# Patient Record
Sex: FEMALE | Race: WHITE | NOT HISPANIC OR LATINO | Employment: OTHER | URBAN - NONMETROPOLITAN AREA
[De-identification: names, ages, dates, MRNs, and addresses within clinical notes are randomized per-mention and may not be internally consistent; named-entity substitution may affect disease eponyms.]

---

## 2017-06-01 PROBLEM — D69.6 THROMBOCYTOPENIA (HCC): Status: ACTIVE | Noted: 2017-06-01

## 2017-06-02 ENCOUNTER — CONSULT (OUTPATIENT)
Dept: ONCOLOGY | Facility: CLINIC | Age: 67
End: 2017-06-02

## 2017-06-02 VITALS
SYSTOLIC BLOOD PRESSURE: 141 MMHG | TEMPERATURE: 97.6 F | BODY MASS INDEX: 38.58 KG/M2 | RESPIRATION RATE: 20 BRPM | DIASTOLIC BLOOD PRESSURE: 68 MMHG | HEART RATE: 65 BPM | HEIGHT: 64 IN | WEIGHT: 226 LBS

## 2017-06-02 DIAGNOSIS — D69.6 THROMBOCYTOPENIA (HCC): Primary | ICD-10-CM

## 2017-06-02 PROCEDURE — 99204 OFFICE O/P NEW MOD 45 MIN: CPT | Performed by: INTERNAL MEDICINE

## 2017-06-02 RX ORDER — ATENOLOL 100 MG/1
TABLET ORAL
COMMUNITY
Start: 2017-05-15 | End: 2019-02-20 | Stop reason: DRUGHIGH

## 2017-06-02 RX ORDER — METFORMIN HYDROCHLORIDE 750 MG/1
TABLET, EXTENDED RELEASE ORAL
COMMUNITY
Start: 2017-05-24

## 2017-06-02 RX ORDER — LOSARTAN POTASSIUM 50 MG/1
TABLET ORAL
COMMUNITY
Start: 2017-05-24

## 2017-06-02 NOTE — PROGRESS NOTES
"CHIEF COMPLAINT: Isolated thrombocytopenia    REASON FOR REFERRAL: Isolated thrombocytopenia      RECORDS OBTAINED  Records of the patients history including those obtained from  Dr. Cox were reviewed and summarized in detail.    HISTORY OF PRESENT ILLNESS:  The patient is a 66 y.o.  female, referred for isolated thrombocytopenia.  Has apparently seen me for this in the past but I have none of those records at this junction.  This is isolated thrombocytopenia with platelets of 50-70,000 dating back at least.  She says it's been present for longer than that.  She denies any other signs or symptoms of autoimmune disease and no Helicobacter pylori though she does have a history of reflux.  Basically feels fine.  No petechiae or ecchymoses.  Denies alcohol use or abuse    REVIEW OF SYSTEMS:  A 14 point review of systems was performed and is negative except as noted above.    Past Medical History:   Diagnosis Date   • Anemia    • Diabetes mellitus    • Hypertension      Past Surgical History:   Procedure Laterality Date   • TUBAL ABDOMINAL LIGATION  1980       No current outpatient prescriptions on file prior to visit.     No current facility-administered medications on file prior to visit.        Allergies   Allergen Reactions   • Penicillins Rash       Social History     Social History   • Marital status: Single     Spouse name: N/A   • Number of children: N/A   • Years of education: N/A     Social History Main Topics   • Smoking status: Never Smoker   • Smokeless tobacco: None   • Alcohol use Yes   • Drug use: None   • Sexual activity: Not Asked     Other Topics Concern   • None     Social History Narrative   • None       Family History   Problem Relation Age of Onset   • Diabetes Father    • Heart disease Father    • Hypertension Father    • Asthma Father    • Heart disease Brother    • Colon cancer Paternal Grandmother        PHYSICAL EXAM:    /68  Pulse 65  Temp 97.6 °F (36.4 °C)  Resp 20  Ht 64\" " (162.6 cm)  Wt 226 lb (103 kg)  BMI 38.79 kg/m2    ECOG: (0) Fully active, able to carry on all predisease performance without restriction  General: well appearing female in no acute distress  HEENT: sclera anicteric, oropharynx clear  Lymphatics: no cervical, supraclavicular, inguinal, or axillary adenopathy  Cardiovascular: regular rate and rhythm, no murmurs  Neck: Supple; No thyromegaly  Lungs: clear to auscultation bilaterally. No respiratory distress.   Abdomen: soft, nontender, nondistended.  No palpable organomegaly  Extremities: no cyanosis, clubbing, edema, or cords  Skin: no rashes, lesions, bruising, or petechiae  Neuro: Alert and oriented x 4; Moving all extremities.  Psych: No anxiety or depression    No results found for any previous visit.    Assessment/Plan     1. Isolated thrombocytopenia    Discussion: We will check her peripheral smear for schistocytes and check her PT PTT d-dimer and fibrin split products for the possibility of DIC.  Her peripheral smear will also be checked for platelet clumping for pseudothrombocytopenia.  We'll check her MARGO and rheumatoid factor and I suspect that this is immune thrombocytopenic purpura for which I would not treat her unless she develops a platelet count below 30,000.  I would not generally do CAT scans her bone marrow biopsies at this junction.  I will check her renal function but thus far has no symptoms to suggest microangiopathic process.  We'll see her back in a few weeks to go over the results of all this and see her blood count is just prior to return as well.      Micah Montiel MD    6/2/2017

## 2017-06-06 LAB
ALBUMIN SERPL-MCNC: 3.8 G/DL (ref 3.6–4.8)
ALBUMIN/GLOB SERPL: 1.3 {RATIO} (ref 1.2–2.2)
ALP SERPL-CCNC: 178 IU/L (ref 39–117)
ALT SERPL-CCNC: 37 IU/L (ref 0–32)
AMBIG ABBREV CMP14 DEFAULT: NORMAL
ANA TITR SER IF: NEGATIVE {TITER}
APTT PPP: 27 SEC (ref 24–33)
AST SERPL-CCNC: 58 IU/L (ref 0–40)
BASOPHILS # BLD AUTO: 0 X10E3/UL (ref 0–0.2)
BASOPHILS NFR BLD AUTO: 1 %
BILIRUB SERPL-MCNC: 0.7 MG/DL (ref 0–1.2)
BUN SERPL-MCNC: 10 MG/DL (ref 8–27)
BUN/CREAT SERPL: 13 (ref 12–28)
CALCIUM SERPL-MCNC: 9.1 MG/DL (ref 8.7–10.3)
CHLORIDE SERPL-SCNC: 100 MMOL/L (ref 96–106)
CO2 SERPL-SCNC: 25 MMOL/L (ref 18–29)
CREAT SERPL-MCNC: 0.8 MG/DL (ref 0.57–1)
D DIMER PPP FEU-MCNC: 0.49 MG/L FEU (ref 0–0.49)
EOSINOPHIL # BLD AUTO: 0.2 X10E3/UL (ref 0–0.4)
EOSINOPHIL NFR BLD AUTO: 5 %
ERYTHROCYTE [DISTWIDTH] IN BLOOD BY AUTOMATED COUNT: 15.3 % (ref 12.3–15.4)
FIBRINOGEN PPP-MCNC: 315 MG/DL (ref 193–507)
GLOBULIN SER CALC-MCNC: 2.9 G/DL (ref 1.5–4.5)
GLUCOSE SERPL-MCNC: 166 MG/DL (ref 65–99)
H PYLORI IGA SER-ACNC: <9 UNITS (ref 0–8.9)
H PYLORI IGG SER IA-ACNC: <0.9 U/ML (ref 0–0.8)
H PYLORI IGM SER-ACNC: <9 UNITS (ref 0–8.9)
HCT VFR BLD AUTO: 39.7 % (ref 34–46.6)
HGB BLD-MCNC: 13.5 G/DL (ref 11.1–15.9)
IMM GRANULOCYTES # BLD: 0 X10E3/UL (ref 0–0.1)
IMM GRANULOCYTES NFR BLD: 0 %
INR PPP: 1.1 (ref 0.8–1.2)
LYMPHOCYTES # BLD AUTO: 1.5 X10E3/UL (ref 0.7–3.1)
LYMPHOCYTES NFR BLD AUTO: 34 %
MCH RBC QN AUTO: 29.2 PG (ref 26.6–33)
MCHC RBC AUTO-ENTMCNC: 34 G/DL (ref 31.5–35.7)
MCV RBC AUTO: 86 FL (ref 79–97)
MONOCYTES # BLD AUTO: 0.2 X10E3/UL (ref 0.1–0.9)
MONOCYTES NFR BLD AUTO: 6 %
MORPHOLOGY BLD-IMP: (no result)
NEUTROPHILS # BLD AUTO: 2.3 X10E3/UL (ref 1.4–7)
NEUTROPHILS NFR BLD AUTO: 54 %
PATH REV BLD -IMP: NORMAL
PATHOLOGIST NAME: NORMAL
PLATELET # BLD AUTO: 54 X10E3/UL (ref 150–379)
POTASSIUM SERPL-SCNC: 4.1 MMOL/L (ref 3.5–5.2)
PROT SERPL-MCNC: 6.7 G/DL (ref 6–8.5)
PROTHROMBIN TIME: 11.2 SEC (ref 9.1–12)
RBC # BLD AUTO: 4.63 X10E6/UL (ref 3.77–5.28)
RHEUMATOID FACT SERPL-ACNC: <10 IU/ML (ref 0–13.9)
SODIUM SERPL-SCNC: 141 MMOL/L (ref 134–144)
WBC # BLD AUTO: 4.3 X10E3/UL (ref 3.4–10.8)

## 2017-08-01 ENCOUNTER — RESULTS ENCOUNTER (OUTPATIENT)
Dept: ONCOLOGY | Facility: CLINIC | Age: 67
End: 2017-08-01

## 2017-08-01 DIAGNOSIS — D69.6 THROMBOCYTOPENIA (HCC): ICD-10-CM

## 2017-08-02 LAB
BASOPHILS # BLD AUTO: 0 X10E3/UL (ref 0–0.2)
BASOPHILS NFR BLD AUTO: 1 %
EOSINOPHIL # BLD AUTO: 0.2 X10E3/UL (ref 0–0.4)
EOSINOPHIL NFR BLD AUTO: 5 %
ERYTHROCYTE [DISTWIDTH] IN BLOOD BY AUTOMATED COUNT: 15.4 % (ref 12.3–15.4)
HCT VFR BLD AUTO: 38.8 % (ref 34–46.6)
HGB BLD-MCNC: 13 G/DL (ref 11.1–15.9)
IMM GRANULOCYTES # BLD: 0 X10E3/UL (ref 0–0.1)
IMM GRANULOCYTES NFR BLD: 0 %
LYMPHOCYTES # BLD AUTO: 1.2 X10E3/UL (ref 0.7–3.1)
LYMPHOCYTES NFR BLD AUTO: 27 %
MCH RBC QN AUTO: 29.1 PG (ref 26.6–33)
MCHC RBC AUTO-ENTMCNC: 33.5 G/DL (ref 31.5–35.7)
MCV RBC AUTO: 87 FL (ref 79–97)
MONOCYTES # BLD AUTO: 0.4 X10E3/UL (ref 0.1–0.9)
MONOCYTES NFR BLD AUTO: 9 %
MORPHOLOGY BLD-IMP: (no result)
NEUTROPHILS # BLD AUTO: 2.6 X10E3/UL (ref 1.4–7)
NEUTROPHILS NFR BLD AUTO: 58 %
PLATELET # BLD AUTO: 58 X10E3/UL (ref 150–379)
RBC # BLD AUTO: 4.47 X10E6/UL (ref 3.77–5.28)
WBC # BLD AUTO: 4.5 X10E3/UL (ref 3.4–10.8)

## 2017-08-03 ENCOUNTER — OFFICE VISIT (OUTPATIENT)
Dept: ONCOLOGY | Facility: CLINIC | Age: 67
End: 2017-08-03

## 2017-08-03 VITALS
TEMPERATURE: 98.6 F | WEIGHT: 228 LBS | RESPIRATION RATE: 20 BRPM | HEIGHT: 64 IN | DIASTOLIC BLOOD PRESSURE: 71 MMHG | HEART RATE: 73 BPM | BODY MASS INDEX: 38.93 KG/M2 | SYSTOLIC BLOOD PRESSURE: 151 MMHG

## 2017-08-03 DIAGNOSIS — D69.6 THROMBOCYTOPENIA (HCC): Primary | ICD-10-CM

## 2017-08-03 PROCEDURE — 99213 OFFICE O/P EST LOW 20 MIN: CPT | Performed by: INTERNAL MEDICINE

## 2017-08-03 NOTE — PROGRESS NOTES
CHIEF COMPLAINT: Thrombocytopenia   Problem List:  1.)  Isolated thrombocytopenia:  -Peripheral smear showed decreased platelets with no clumping or schistocytes  -INR 1.1 with PTT 27  -D-dimer 0.49 with normal fibrinogen  -Helicobacter pylori negative  -MARGO and rheumatoid factor negative  - creatinine 0.8  -AST 58 with a ALT 37 and alkaline phosphatase of 178  HISTORY OF PRESENT ILLNESS:  The patient is a 66 y.o. female, here for follow up on management of isolated thrombocytopenia.  No bleeding problems or ecchymoses.  No rashes.  No synovitis.  No adenopathy.  No unexplained weight loss or bed drenching night sweats.  Workup as above only showed mild elevation of the AST and ALT and alkaline phosphatase otherwise unremarkable.  Past Medical History:   Diagnosis Date   • Anemia    • Diabetes mellitus    • Hypertension      Past Surgical History:   Procedure Laterality Date   • TUBAL ABDOMINAL LIGATION  1980       Allergies   Allergen Reactions   • Penicillins Rash       Family History and Social History reviewed and changed as necessary      REVIEW OF SYSTEM:   Review of Systems   Constitutional: Negative for appetite change, chills, diaphoresis, fatigue, fever and unexpected weight change.   HENT:   Negative for mouth sores, sore throat and trouble swallowing.    Eyes: Negative for icterus.   Respiratory: Negative for cough, hemoptysis and shortness of breath.    Cardiovascular: Negative for chest pain, leg swelling and palpitations.   Gastrointestinal: Negative for abdominal distention, abdominal pain, blood in stool, constipation, diarrhea, nausea and vomiting.   Endocrine: Negative for hot flashes.   Genitourinary: Negative for bladder incontinence, difficulty urinating, dysuria, frequency and hematuria.    Musculoskeletal: Negative for gait problem, neck pain and neck stiffness.   Skin: Negative for rash.   Neurological: Negative for dizziness, gait problem, headaches, light-headedness and numbness.  "  Hematological: Negative for adenopathy. Does not bruise/bleed easily.   Psychiatric/Behavioral: Negative for depression. The patient is not nervous/anxious.    All other systems reviewed and are negative.       PHYSICAL EXAM    Vitals:    08/03/17 1309   BP: 151/71   Pulse: 73   Resp: 20   Temp: 98.6 °F (37 °C)   Weight: 228 lb (103 kg)   Height: 64\" (162.6 cm)     Constitutional: Appears well-developed and well-nourished. No distress.   ECOG: (0) Fully active, able to carry on all predisease performance without restriction  HENT:   Head: Normocephalic.   Mouth/Throat: Oropharynx is clear and moist.   Eyes: Conjunctivae are normal. Pupils are equal, round, and reactive to light. No scleral icterus.   Neck: Neck supple. No JVD present. No thyromegaly present.   Cardiovascular: Normal rate, regular rhythm and normal heart sounds.    Pulmonary/Chest: Breath sounds normal. No respiratory distress.   Abdominal: Soft. Exhibits no distension and no mass. There is no hepatosplenomegaly. There is no tenderness. There is no rebound and no guarding.   Musculoskeletal:Exhibits no edema, tenderness or deformity.   Neurological: Alert and oriented to person, place, and time. Exhibits normal muscle tone.   Skin: No ecchymosis, no petechiae and no rash noted. Not diaphoretic. No cyanosis. Nails show no clubbing.   Psychiatric: Normal mood and affect.   Vitals reviewed.      Orders Only on 08/01/2017   Component Date Value Ref Range Status   • WBC 08/01/2017 4.5  3.4 - 10.8 x10E3/uL Final   • RBC 08/01/2017 4.47  3.77 - 5.28 x10E6/uL Final   • Hemoglobin 08/01/2017 13.0  11.1 - 15.9 g/dL Final   • Hematocrit 08/01/2017 38.8  34.0 - 46.6 % Final   • MCV 08/01/2017 87  79 - 97 fL Final   • MCH 08/01/2017 29.1  26.6 - 33.0 pg Final   • MCHC 08/01/2017 33.5  31.5 - 35.7 g/dL Final   • RDW 08/01/2017 15.4  12.3 - 15.4 % Final   • Platelets 08/01/2017 58* 150 - 379 x10E3/uL Final    Platelet count verified by examination of peripheral " blood smear.   • Neutrophil Rel % 08/01/2017 58  % Final   • Lymphocyte Rel % 08/01/2017 27  % Final   • Monocyte Rel % 08/01/2017 9  % Final   • Eosinophil Rel % 08/01/2017 5  % Final   • Basophil Rel % 08/01/2017 1  % Final   • Neutrophils Absolute 08/01/2017 2.6  1.4 - 7.0 x10E3/uL Final   • Lymphocytes Absolute 08/01/2017 1.2  0.7 - 3.1 x10E3/uL Final   • Monocytes Absolute 08/01/2017 0.4  0.1 - 0.9 x10E3/uL Final   • Eosinophils Absolute 08/01/2017 0.2  0.0 - 0.4 x10E3/uL Final   • Basophils Absolute 08/01/2017 0.0  0.0 - 0.2 x10E3/uL Final   • Immature Granulocyte Rel % 08/01/2017 0  % Final   • Immature Grans Absolute 08/01/2017 0.0  0.0 - 0.1 x10E3/uL Final   • Hematology Comments: 08/01/2017 Note:   Final    Manual differential was performed.       Assessment/Plan     1.  Thrombocytopenia  2. Diabetes  3. Elevated liver enzymes    Discussion: I suspect she has fatty liver disease in light of her diabetes causing a mild elevation in her liver enzymes but we will check a of her spleen ultrasound to look for evidences of portal hypertension causing the thrombocytopenia.  We'll call her those results and otherwise if that is unremarkable I would suspect that this is immune thrombocytopenic purpura with isolated thrombocytopenia in an otherwise fairly healthy middle-age female.  No other stigmata to suggest TTP HUS, DIC, other associated autoimmune diseases, or lymphoma or other malignant causes.  If her platelets are staying over 30,000 that would not give steroids but we'll check her blood counts quarterly and see her back in 6 months.  Micah Montiel MD    08/03/2017

## 2017-08-10 ENCOUNTER — TELEPHONE (OUTPATIENT)
Dept: ONCOLOGY | Facility: CLINIC | Age: 67
End: 2017-08-10

## 2017-08-10 DIAGNOSIS — K76.6 PORTAL HYPERTENSION SYNDROME (HCC): Primary | ICD-10-CM

## 2017-08-10 NOTE — TELEPHONE ENCOUNTER
8/7/17 liver spleen ultrasound shows portal vein to be patent with hepato-petal flow.  The flow pattern is normal.  The portal vein is dilated to 1.7 cm consistent with portal hypertension.  Superior mesenteric and splenic veins are patent with normal flow.  The superior mesenteric vein is dilated at 1.4 cm consistent with portal hypertension.  There is recanalized umbilical vein showing hepatofugal flow emptying into the right common femoral vein.  The hepatic veins are patent without outflow obstruction and hepatic arterial blood flow is within normal showing no arterial occlusive disease.  The inferior vena cava is patent and the flow is normal.  The spleen is 14.1 x 6.3 x 13.6 cm of splenomegaly.  There are is no intrahepatic ductal dilation.  No biliary obstruction.  No masses identified within the liver parenchyma.  No cholelithiasis.  Could not visualize the pancreas due to overlying bowel gas.    Platelets stable in the 50,000 without bleeding and the rest of her clotting studies in June were normal and her liver enzymes showed minimal elevation of her AST and ALT to 30's and 50s respectively.  no anemia and no leukopenia.    I called the patient and went over these results with her.  I'm going to make an appointment with [facility] and I've spoken with Dr. Chico Kee who saw this study.  He does not think there is anything here that would warrant anticoagulation.  I will defer to [facility] for testing for hemochromatosis, autoimmune hepatitis, fatty liver infiltration with nonalcoholic steatohepatitis, and to decide whether T IPS is warranted though I doubted given that she seems to have already collateralized successfully.  I'm going to send her to Dr. Gray and colleagues.

## 2017-11-08 ENCOUNTER — RESULTS ENCOUNTER (OUTPATIENT)
Dept: ONCOLOGY | Facility: CLINIC | Age: 67
End: 2017-11-08

## 2017-11-08 DIAGNOSIS — D69.6 THROMBOCYTOPENIA (HCC): ICD-10-CM

## 2017-11-15 LAB
BASOPHILS # BLD AUTO: 0 X10E3/UL (ref 0–0.2)
BASOPHILS NFR BLD AUTO: 1 %
EOSINOPHIL # BLD AUTO: 0.2 X10E3/UL (ref 0–0.4)
EOSINOPHIL NFR BLD AUTO: 6 %
ERYTHROCYTE [DISTWIDTH] IN BLOOD BY AUTOMATED COUNT: 15.4 % (ref 12.3–15.4)
HCT VFR BLD AUTO: 38.1 % (ref 34–46.6)
HGB BLD-MCNC: 12.8 G/DL (ref 11.1–15.9)
IMM GRANULOCYTES # BLD: 0 X10E3/UL (ref 0–0.1)
IMM GRANULOCYTES NFR BLD: 0 %
LYMPHOCYTES # BLD AUTO: 1 X10E3/UL (ref 0.7–3.1)
LYMPHOCYTES NFR BLD AUTO: 26 %
MCH RBC QN AUTO: 29.4 PG (ref 26.6–33)
MCHC RBC AUTO-ENTMCNC: 33.6 G/DL (ref 31.5–35.7)
MCV RBC AUTO: 88 FL (ref 79–97)
MONOCYTES # BLD AUTO: 0.4 X10E3/UL (ref 0.1–0.9)
MONOCYTES NFR BLD AUTO: 10 %
MORPHOLOGY BLD-IMP: (no result)
NEUTROPHILS # BLD AUTO: 2.3 X10E3/UL (ref 1.4–7)
NEUTROPHILS NFR BLD AUTO: 57 %
PLATELET # BLD AUTO: 54 X10E3/UL (ref 150–379)
RBC # BLD AUTO: 4.35 X10E6/UL (ref 3.77–5.28)
WBC # BLD AUTO: 4 X10E3/UL (ref 3.4–10.8)

## 2018-02-06 ENCOUNTER — RESULTS ENCOUNTER (OUTPATIENT)
Dept: ONCOLOGY | Facility: CLINIC | Age: 68
End: 2018-02-06

## 2018-02-06 DIAGNOSIS — D69.6 THROMBOCYTOPENIA (HCC): ICD-10-CM

## 2018-02-23 ENCOUNTER — OFFICE VISIT (OUTPATIENT)
Dept: ONCOLOGY | Facility: CLINIC | Age: 68
End: 2018-02-23

## 2018-02-23 VITALS
TEMPERATURE: 98.3 F | WEIGHT: 217 LBS | HEART RATE: 72 BPM | SYSTOLIC BLOOD PRESSURE: 155 MMHG | DIASTOLIC BLOOD PRESSURE: 63 MMHG | BODY MASS INDEX: 37.05 KG/M2 | HEIGHT: 64 IN | RESPIRATION RATE: 18 BRPM

## 2018-02-23 DIAGNOSIS — D69.6 THROMBOCYTOPENIA (HCC): Primary | ICD-10-CM

## 2018-02-23 PROCEDURE — 99214 OFFICE O/P EST MOD 30 MIN: CPT | Performed by: INTERNAL MEDICINE

## 2018-02-23 RX ORDER — FLUCONAZOLE 100 MG/1
100 TABLET ORAL DAILY
Qty: 6 TABLET | Refills: 0 | Status: SHIPPED | OUTPATIENT
Start: 2018-02-23 | End: 2019-02-20 | Stop reason: DRUGHIGH

## 2018-02-23 NOTE — PROGRESS NOTES
CHIEF COMPLAINT: vaginitis    Problem List:  Oncology/Hematology History    1. Thrombocytopenia  -Peripheral smear showed decreased platelets with no clumping or schistocytes  -INR 1.1 with PTT 27  -D-dimer 0.49 with normal fibrinogen  -Helicobacter pylori negative  -MARGO and rheumatoid factor negative  - creatinine 0.8  -AST 58 with a ALT 37 and alkaline phosphatase of 178  -8/7/17 liver spleen ultrasound shows portal vein to be patent with hepato-petal flow.  The flow pattern is normal.  The portal vein is dilated to 1.7 cm consistent with portal hypertension.  Superior mesenteric and splenic veins are patent with normal flow.  The superior mesenteric vein is dilated at 1.4 cm consistent with portal hypertension.  There is recanalized umbilical vein showing hepatofugal flow emptying into the right common femoral vein.  The hepatic veins are patent without outflow obstruction and hepatic arterial blood flow is within normal showing no arterial occlusive disease.  The inferior vena cava is patent and the flow is normal.  The spleen is 14.1 x 6.3 x 13.6 cm of splenomegaly.  There are is no intrahepatic ductal dilation.  No biliary obstruction.  No masses identified within the liver parenchyma.  No cholelithiasis.  Could not visualize the pancreas due to overlying bowel gas.     Platelets stable in the 50,000 without bleeding and the rest of her clotting studies in June were normal and her liver enzymes showed minimal elevation of her AST and ALT to 30's and 50s respectively.  no anemia and no leukopenia.        Thrombocytopenia    6/1/2017 Initial Diagnosis     Thrombocytopenia          HISTORY OF PRESENT ILLNESS:  The patient is a 67 y.o. female, here for follow up on management of portal hypertension with thrombocytopenia.  Platelets 60,000's with no hemostatic issues.  Was in the emergency room the 13th of this month with colitis.  On antibiotics that has improved greatly but now she has yeast vaginitis.  She saw  "the hepatologists at [facility] and they are monitoring her portal hypertension.      Past Medical History:   Diagnosis Date   • Anemia    • Diabetes mellitus    • Hypertension      Past Surgical History:   Procedure Laterality Date   • TUBAL ABDOMINAL LIGATION  1980       Allergies   Allergen Reactions   • Penicillins Rash       Family History and Social History reviewed and changed as necessary      REVIEW OF SYSTEM:   Review of Systems   Constitutional: Negative for appetite change, chills, diaphoresis, fatigue, fever and unexpected weight change.   HENT:   Negative for mouth sores, sore throat and trouble swallowing.    Eyes: Negative for icterus.   Respiratory: Negative for cough, hemoptysis and shortness of breath.    Cardiovascular: Negative for chest pain, leg swelling and palpitations.   Gastrointestinal: Negative for abdominal distention, abdominal pain, blood in stool, constipation, diarrhea, nausea and vomiting.   Endocrine: Negative for hot flashes.   Genitourinary: Negative for bladder incontinence, difficulty urinating, dysuria, frequency and hematuria.    Musculoskeletal: Negative for gait problem, neck pain and neck stiffness.   Skin: Negative for rash.   Neurological: Negative for dizziness, gait problem, headaches, light-headedness and numbness.   Hematological: Negative for adenopathy. Does not bruise/bleed easily.   Psychiatric/Behavioral: Negative for depression. The patient is not nervous/anxious.    All other systems reviewed and are negative.       PHYSICAL EXAM    Vitals:    02/23/18 1440   BP: 155/63   Pulse: 72   Resp: 18   Temp: 98.3 °F (36.8 °C)   Weight: 98.4 kg (217 lb)   Height: 162.6 cm (64\")     Constitutional: Appears well-developed and well-nourished. No distress.   ECOG: (0) Fully active, able to carry on all predisease performance without restriction  HENT:   Head: Normocephalic.   Mouth/Throat: Oropharynx is clear and moist.   Eyes: Conjunctivae are normal. Pupils are equal, round, " and reactive to light. No scleral icterus.   Neck: Neck supple. No JVD present. No thyromegaly present.   Cardiovascular: Normal rate, regular rhythm and normal heart sounds.    Pulmonary/Chest: Breath sounds normal. No respiratory distress.   Abdominal: Soft. Exhibits no distension and no mass. There is no hepatosplenomegaly. There is no tenderness. There is no rebound and no guarding.   Musculoskeletal:Exhibits no edema, tenderness or deformity.   Neurological: Alert and oriented to person, place, and time. Exhibits normal muscle tone.   Skin: No ecchymosis, no petechiae and no rash noted. Not diaphoretic. No cyanosis. Nails show no clubbing.   Psychiatric: Normal mood and affect.   Vitals reviewed.        Assessment/Plan     1.  portal hypertension  2. Thrombocytopenia  3. Colitis  4. Yeast vaginitis    Discussion: I will give her a prescription for Diflucan for her yeast vaginitis.  I will see her back annually but from the thrombocytopenia standpoint, I have nothing to add.  She has not had bleeding problems  And is unlikely to have such.  Transfusions are unlikely to be  Due to splenic sequestration.  She will follow-up with [facility] hepatology      Micah Montiel MD    02/23/2018

## 2019-02-19 LAB
ALBUMIN SERPL-MCNC: 3.9 G/DL (ref 3.5–4.8)
ALBUMIN/GLOB SERPL: 1.4 {RATIO} (ref 1.2–2.2)
ALP SERPL-CCNC: 200 IU/L (ref 39–117)
ALT SERPL-CCNC: 43 IU/L (ref 0–32)
AMBIG ABBREV CMP14 DEFAULT: NORMAL
AST SERPL-CCNC: 59 IU/L (ref 0–40)
BASOPHILS # BLD AUTO: 0 X10E3/UL (ref 0–0.2)
BASOPHILS NFR BLD AUTO: 1 %
BILIRUB SERPL-MCNC: 1.5 MG/DL (ref 0–1.2)
BUN SERPL-MCNC: 11 MG/DL (ref 8–27)
BUN/CREAT SERPL: 16 (ref 12–28)
CALCIUM SERPL-MCNC: 9 MG/DL (ref 8.7–10.3)
CHLORIDE SERPL-SCNC: 106 MMOL/L (ref 96–106)
CO2 SERPL-SCNC: 22 MMOL/L (ref 20–29)
CREAT SERPL-MCNC: 0.69 MG/DL (ref 0.57–1)
EOSINOPHIL # BLD AUTO: 0.3 X10E3/UL (ref 0–0.4)
EOSINOPHIL NFR BLD AUTO: 10 %
ERYTHROCYTE [DISTWIDTH] IN BLOOD BY AUTOMATED COUNT: 16.5 % (ref 12.3–15.4)
GLOBULIN SER CALC-MCNC: 2.7 G/DL (ref 1.5–4.5)
GLUCOSE SERPL-MCNC: 222 MG/DL (ref 65–99)
HCT VFR BLD AUTO: 35.4 % (ref 34–46.6)
HGB BLD-MCNC: 11.9 G/DL (ref 11.1–15.9)
IMM GRANULOCYTES # BLD AUTO: 0 X10E3/UL (ref 0–0.1)
IMM GRANULOCYTES NFR BLD AUTO: 0 %
LYMPHOCYTES # BLD AUTO: 0.9 X10E3/UL (ref 0.7–3.1)
LYMPHOCYTES NFR BLD AUTO: 28 %
MCH RBC QN AUTO: 28.8 PG (ref 26.6–33)
MCHC RBC AUTO-ENTMCNC: 33.6 G/DL (ref 31.5–35.7)
MCV RBC AUTO: 86 FL (ref 79–97)
MONOCYTES # BLD AUTO: 0.3 X10E3/UL (ref 0.1–0.9)
MONOCYTES NFR BLD AUTO: 8 %
MORPHOLOGY BLD-IMP: (no result)
NEUTROPHILS # BLD AUTO: 1.6 X10E3/UL (ref 1.4–7)
NEUTROPHILS NFR BLD AUTO: 53 %
PLATELET # BLD AUTO: 46 X10E3/UL (ref 150–379)
POTASSIUM SERPL-SCNC: 4 MMOL/L (ref 3.5–5.2)
PROT SERPL-MCNC: 6.6 G/DL (ref 6–8.5)
RBC # BLD AUTO: 4.13 X10E6/UL (ref 3.77–5.28)
SODIUM SERPL-SCNC: 141 MMOL/L (ref 134–144)
WBC # BLD AUTO: 3 X10E3/UL (ref 3.4–10.8)

## 2019-02-20 ENCOUNTER — OFFICE VISIT (OUTPATIENT)
Dept: ONCOLOGY | Facility: CLINIC | Age: 69
End: 2019-02-20

## 2019-02-20 VITALS
DIASTOLIC BLOOD PRESSURE: 63 MMHG | HEIGHT: 64 IN | BODY MASS INDEX: 36.02 KG/M2 | WEIGHT: 211 LBS | RESPIRATION RATE: 18 BRPM | TEMPERATURE: 98.5 F | SYSTOLIC BLOOD PRESSURE: 154 MMHG | HEART RATE: 69 BPM

## 2019-02-20 DIAGNOSIS — K76.6 PORTAL HYPERTENSION SYNDROME (HCC): ICD-10-CM

## 2019-02-20 DIAGNOSIS — D69.6 THROMBOCYTOPENIA (HCC): Primary | ICD-10-CM

## 2019-02-20 PROCEDURE — 99213 OFFICE O/P EST LOW 20 MIN: CPT | Performed by: NURSE PRACTITIONER

## 2019-02-20 RX ORDER — NADOLOL 20 MG/1
TABLET ORAL
COMMUNITY
Start: 2019-02-15

## 2019-02-20 RX ORDER — FLUCONAZOLE 150 MG/1
TABLET ORAL
COMMUNITY
Start: 2019-01-21

## 2019-02-20 NOTE — PROGRESS NOTES
CHIEF COMPLAINT: vaginitis    Problem List:  Oncology/Hematology History    1. Thrombocytopenia  -Peripheral smear showed decreased platelets with no clumping or schistocytes  -INR 1.1 with PTT 27  -D-dimer 0.49 with normal fibrinogen  -Helicobacter pylori negative  -MARGO and rheumatoid factor negative  - creatinine 0.8  -AST 58 with a ALT 37 and alkaline phosphatase of 178  -8/7/17 liver spleen ultrasound shows portal vein to be patent with hepato-petal flow.  The flow pattern is normal.  The portal vein is dilated to 1.7 cm consistent with portal hypertension.  Superior mesenteric and splenic veins are patent with normal flow.  The superior mesenteric vein is dilated at 1.4 cm consistent with portal hypertension.  There is recanalized umbilical vein showing hepatofugal flow emptying into the right common femoral vein.  The hepatic veins are patent without outflow obstruction and hepatic arterial blood flow is within normal showing no arterial occlusive disease.  The inferior vena cava is patent and the flow is normal.  The spleen is 14.1 x 6.3 x 13.6 cm of splenomegaly.  There are is no intrahepatic ductal dilation.  No biliary obstruction.  No masses identified within the liver parenchyma.  No cholelithiasis.  Could not visualize the pancreas due to overlying bowel gas.     Platelets stable in the 50,000 without bleeding and the rest of her clotting studies in June were normal and her liver enzymes showed minimal elevation of her AST and ALT to 30's and 50s respectively.  no anemia and no leukopenia.        Thrombocytopenia (CMS/HCC)    6/1/2017 Initial Diagnosis     Thrombocytopenia          HISTORY OF PRESENT ILLNESS:  The patient is a 68 y.o. female, here for follow up on management of portal hypertension with thrombocytopenia.  The patient's platelets have remained stable in the 50,000 months.  She is continued be followed through [facility].  She sees him every 6 months with repeat ultrasound.  She  recently had an EGD done that revealed esophageal varices.  Her hepatologist stopped her atenolol and started her on nadolol daily.  She has scheduled follow-up with him in 6 months with repeat ultrasound and EGD.  She did not require any banding of her esophageal varices last month.  She has had no episodes of bleeding.  She has had no recent illnesses.  She does complain of some right hip and low back discomfort.  Her primary care physician ordered x-rays of her hip that per her report within normal limits.  Past Medical History:   Diagnosis Date   • Anemia    • Diabetes mellitus (CMS/HCC)    • Hypertension      Past Surgical History:   Procedure Laterality Date   • TUBAL ABDOMINAL LIGATION  1980       Allergies   Allergen Reactions   • Penicillins Rash       Family History and Social History reviewed and changed as necessary      REVIEW OF SYSTEM:   Review of Systems   Constitutional: Negative for appetite change, chills, diaphoresis, fatigue, fever and unexpected weight change.   HENT:   Negative for mouth sores, sore throat and trouble swallowing.    Eyes: Negative for icterus.   Respiratory: Negative for cough, hemoptysis and shortness of breath.    Cardiovascular: Negative for chest pain, leg swelling and palpitations.   Gastrointestinal: Negative for abdominal distention, abdominal pain, blood in stool, constipation, diarrhea, nausea and vomiting.   Endocrine: Negative for hot flashes.   Genitourinary: Negative for bladder incontinence, difficulty urinating, dysuria, frequency and hematuria.    Musculoskeletal: Negative for gait problem, neck pain and neck stiffness. + right sided low back and hip discomfort  Skin: Negative for rash.   Neurological: Negative for dizziness, gait problem, headaches, light-headedness and numbness.   Hematological: Negative for adenopathy. Does not bruise/bleed easily.   Psychiatric/Behavioral: Negative for depression. The patient is not nervous/anxious.    All other systems  "reviewed and are negative.       PHYSICAL EXAM    Vitals:    02/20/19 1042   BP: 154/63   Pulse: 69   Resp: 18   Temp: 98.5 °F (36.9 °C)   Weight: 95.7 kg (211 lb)   Height: 162.6 cm (64\")     Constitutional: Appears well-developed and well-nourished. No distress.   ECOG: (0) Fully active, able to carry on all predisease performance without restriction  HENT:   Head: Normocephalic.   Mouth/Throat: Oropharynx is clear and moist.   Eyes: Conjunctivae are normal. Pupils are equal, round, and reactive to light. No scleral icterus.   Neck: Neck supple. No JVD present. No thyromegaly present.   Cardiovascular: Normal rate, regular rhythm and normal heart sounds.    Pulmonary/Chest: Breath sounds normal. No respiratory distress.   Abdominal: Soft. Exhibits no distension and no mass. There is no hepatosplenomegaly. There is no tenderness. There is no rebound and no guarding.   Musculoskeletal:Exhibits no edema, tenderness or deformity.   Neurological: Alert and oriented to person, place, and time. Exhibits normal muscle tone.   Skin: No ecchymosis, no petechiae and no rash noted. Not diaphoretic. No cyanosis. Nails show no clubbing.   Psychiatric: Normal mood and affect.   Vitals reviewed.        Assessment/Plan     1. Portal hypertension  2. Thrombocytopenia  3. History of colitis    Discussion:  I reviewed the lab results from 2/18/2019 with the patient.  I told her that her platelets are down to 46,000.  Her white count is mildly low at 3.0 with normal neutrophil count.  Her hemoglobin and hematocrit are within normal limits.  The patient's liver enzymes are mildly elevated with alkaline phosphatase of 200, AST 59, ALT 43.  I reviewed with the patient that her abnormal blood counts are likely secondary to ongoing portal hypertension with splenic sequestration.  The patient will continue follow-up with [facility] every 6 months.  We will order a CBC to be drawn in 6 months time with follow-up with us in one year with " labs at that time as well.  I told the patient to notify us sooner should she have any symptoms or episodes of bleeding, or if any changes in blood counts identified through her primary care doctor or hepatologist.  I advised her to continue efforts towards weight management secondary to fatty liver disease.    Malini Chacon, APRN    02/20/19

## 2020-02-21 LAB
ALBUMIN SERPL-MCNC: 3.6 G/DL (ref 3.7–4.7)
ALBUMIN/GLOB SERPL: 1.4 {RATIO} (ref 1.2–2.2)
ALP SERPL-CCNC: 165 IU/L (ref 39–117)
ALT SERPL-CCNC: 28 IU/L (ref 0–32)
AST SERPL-CCNC: 47 IU/L (ref 0–40)
BASOPHILS # BLD AUTO: 0 X10E3/UL (ref 0–0.2)
BASOPHILS NFR BLD AUTO: 0 %
BILIRUB SERPL-MCNC: 1.2 MG/DL (ref 0–1.2)
BUN SERPL-MCNC: 17 MG/DL (ref 8–27)
BUN/CREAT SERPL: 21 (ref 12–28)
CALCIUM SERPL-MCNC: 9 MG/DL (ref 8.7–10.3)
CHLORIDE SERPL-SCNC: 107 MMOL/L (ref 96–106)
CO2 SERPL-SCNC: 24 MMOL/L (ref 20–29)
CREAT SERPL-MCNC: 0.82 MG/DL (ref 0.57–1)
EOSINOPHIL # BLD AUTO: 0.3 X10E3/UL (ref 0–0.4)
EOSINOPHIL NFR BLD AUTO: 6 %
ERYTHROCYTE [DISTWIDTH] IN BLOOD BY AUTOMATED COUNT: 15.9 % (ref 11.7–15.4)
GLOBULIN SER CALC-MCNC: 2.6 G/DL (ref 1.5–4.5)
GLUCOSE SERPL-MCNC: 130 MG/DL (ref 65–99)
HCT VFR BLD AUTO: 33.8 % (ref 34–46.6)
HGB BLD-MCNC: 11.6 G/DL (ref 11.1–15.9)
IMM GRANULOCYTES # BLD AUTO: 0 X10E3/UL (ref 0–0.1)
IMM GRANULOCYTES NFR BLD AUTO: 0 %
LYMPHOCYTES # BLD AUTO: 0.7 X10E3/UL (ref 0.7–3.1)
LYMPHOCYTES NFR BLD AUTO: 16 %
MCH RBC QN AUTO: 28.6 PG (ref 26.6–33)
MCHC RBC AUTO-ENTMCNC: 34.3 G/DL (ref 31.5–35.7)
MCV RBC AUTO: 84 FL (ref 79–97)
MONOCYTES # BLD AUTO: 0.3 X10E3/UL (ref 0.1–0.9)
MONOCYTES NFR BLD AUTO: 7 %
MORPHOLOGY BLD-IMP: ABNORMAL
NEUTROPHILS # BLD AUTO: 3.3 X10E3/UL (ref 1.4–7)
NEUTROPHILS NFR BLD AUTO: 71 %
PLATELET # BLD AUTO: 43 X10E3/UL (ref 150–450)
POTASSIUM SERPL-SCNC: 4.3 MMOL/L (ref 3.5–5.2)
PROT SERPL-MCNC: 6.2 G/DL (ref 6–8.5)
RBC # BLD AUTO: 4.05 X10E6/UL (ref 3.77–5.28)
SODIUM SERPL-SCNC: 142 MMOL/L (ref 134–144)
WBC # BLD AUTO: 4.6 X10E3/UL (ref 3.4–10.8)

## 2020-02-25 ENCOUNTER — OFFICE VISIT (OUTPATIENT)
Dept: ONCOLOGY | Facility: CLINIC | Age: 70
End: 2020-02-25

## 2020-02-25 VITALS
HEART RATE: 89 BPM | BODY MASS INDEX: 35.17 KG/M2 | DIASTOLIC BLOOD PRESSURE: 57 MMHG | WEIGHT: 206 LBS | OXYGEN SATURATION: 100 % | SYSTOLIC BLOOD PRESSURE: 151 MMHG | TEMPERATURE: 98.4 F | HEIGHT: 64 IN | RESPIRATION RATE: 18 BRPM

## 2020-02-25 DIAGNOSIS — D69.6 THROMBOCYTOPENIA (HCC): Primary | ICD-10-CM

## 2020-02-25 PROCEDURE — 99213 OFFICE O/P EST LOW 20 MIN: CPT | Performed by: INTERNAL MEDICINE

## 2020-02-25 RX ORDER — GLIMEPIRIDE 2 MG/1
TABLET ORAL
COMMUNITY
Start: 2019-12-20

## 2020-02-25 NOTE — PROGRESS NOTES
CHIEF COMPLAINT: Follow-up thrombocytopenia    Problem List:  Oncology/Hematology History    1. Thrombocytopenia  -Peripheral smear showed decreased platelets with no clumping or schistocytes  -INR 1.1 with PTT 27  -D-dimer 0.49 with normal fibrinogen  -Helicobacter pylori negative  -MARGO and rheumatoid factor negative  - creatinine 0.8  -AST 58 with a ALT 37 and alkaline phosphatase of 178  -8/7/17 liver spleen ultrasound shows portal vein to be patent with hepato-petal flow.  The flow pattern is normal.  The portal vein is dilated to 1.7 cm consistent with portal hypertension.  Superior mesenteric and splenic veins are patent with normal flow.  The superior mesenteric vein is dilated at 1.4 cm consistent with portal hypertension.  There is recanalized umbilical vein showing hepatofugal flow emptying into the right common femoral vein.  The hepatic veins are patent without outflow obstruction and hepatic arterial blood flow is within normal showing no arterial occlusive disease.  The inferior vena cava is patent and the flow is normal.  The spleen is 14.1 x 6.3 x 13.6 cm of splenomegaly.  There are is no intrahepatic ductal dilation.  No biliary obstruction.  No masses identified within the liver parenchyma.  No cholelithiasis.  Could not visualize the pancreas due to overlying bowel gas.     Platelets stable in the 50,000 without bleeding and the rest of her clotting studies in June were normal and her liver enzymes showed minimal elevation of her AST and ALT to 30's and 50s respectively.  no anemia and no leukopenia.        Thrombocytopenia (CMS/HCC)    6/1/2017 Initial Diagnosis     Thrombocytopenia         HISTORY OF PRESENT ILLNESS:  The patient is a 69 y.o. female, here for follow up on management of portal hypertension with thrombocytopenia.  The patient's platelets have remained stable in the 40 to  50,000 for many months.  She is continued be followed through [facility].   She recently had an EGD done  that revealed esophageal varices.  Her hepatologist stopped her atenolol and started her on nadolol daily.  She has scheduled follow-up with him repeat ultrasound and EGD.  She did not require any banding of her esophageal varices last month.  She has had no episodes of bleeding.  She has had no recent illnesses.  She does complain of some right hip and low back discomfort.  Her primary care physician ordered x-rays of her hip that per her report within normal limits.  Past Medical History:   Diagnosis Date   • Anemia    • Diabetes mellitus (CMS/HCC)    • Hypertension      Past Surgical History:   Procedure Laterality Date   • TUBAL ABDOMINAL LIGATION  1980       Allergies   Allergen Reactions   • Penicillins Rash       Family History and Social History reviewed and changed as necessary      REVIEW OF SYSTEM:   Review of Systems   Constitutional: Negative for appetite change, chills, diaphoresis, fatigue, fever and unexpected weight change.   HENT:   Negative for mouth sores, sore throat and trouble swallowing.    Eyes: Negative for icterus.   Respiratory: Negative for cough, hemoptysis and shortness of breath.    Cardiovascular: Negative for chest pain, leg swelling and palpitations.   Gastrointestinal: Negative for abdominal distention, abdominal pain, blood in stool, constipation, diarrhea, nausea and vomiting.   Endocrine: Negative for hot flashes.   Genitourinary: Negative for bladder incontinence, difficulty urinating, dysuria, frequency and hematuria.    Musculoskeletal: Negative for gait problem, neck pain and neck stiffness. + right sided low back and hip discomfort  Skin: Negative for rash.   Neurological: Negative for dizziness, gait problem, headaches, light-headedness and numbness.   Hematological: Negative for adenopathy. Does not bruise/bleed easily.   Psychiatric/Behavioral: Negative for depression. The patient is not nervous/anxious.    All other systems reviewed and are negative.       PHYSICAL  "EXAM    Vitals:    02/25/20 0940   BP: 151/57   Pulse: 89   Resp: 18   Temp: 98.4 °F (36.9 °C)   SpO2: 100%   Weight: 93.4 kg (206 lb)   Height: 162.6 cm (64\")     Constitutional: Appears well-developed and well-nourished. No distress.   ECOG: (0) Fully active, able to carry on all predisease performance without restriction  HENT:   Head: Normocephalic.   Mouth/Throat: Oropharynx is clear and moist.   Eyes: Conjunctivae are normal. Pupils are equal, round, and reactive to light. No scleral icterus.   Neck: Neck supple. No JVD present. No thyromegaly present.   Cardiovascular: Normal rate, regular rhythm and normal heart sounds.    Pulmonary/Chest: Breath sounds normal. No respiratory distress.   Abdominal: Soft. Exhibits no distension and no mass. There is no hepatosplenomegaly. There is no tenderness. There is no rebound and no guarding.   Musculoskeletal:Exhibits no edema, tenderness or deformity.   Neurological: Alert and oriented to person, place, and time. Exhibits normal muscle tone.   Skin: No ecchymosis, no petechiae and no rash noted. Not diaphoretic. No cyanosis. Nails show no clubbing.   Psychiatric: Normal mood and affect.   Vitals reviewed.        Assessment/Plan     1. Portal hypertension  2. Thrombocytopenia due to problem #1  3. History of colitis    Discussion:  2/20/2020 platelets stable 43,000 and hemoglobin 11.6 with normal MCV.  White count normal with normal differential.  Saw  hepatology in July.  EGD and colonoscopy showed esophageal varices as well as portal hypertensive gastropathy and colopathy.  If and when she needs invasive procedures we could give her avatrombopag temporarily to help bring the platelet counts for such procedure but as there is none planned and unlikely to be any in the foreseeable future and, as we are adding nothing to her care as we have no \"fix\" for this portal hypertension-induced thrombocytopenia, I will just see her back on an as-needed basis from this point " forward.  Discussed with patient face-to-face 15 minutes greater than 50% spent counseling regarding this plan.  Micah Montiel MD    02/25/20